# Patient Record
Sex: FEMALE | Race: WHITE | NOT HISPANIC OR LATINO | Employment: FULL TIME | URBAN - METROPOLITAN AREA
[De-identification: names, ages, dates, MRNs, and addresses within clinical notes are randomized per-mention and may not be internally consistent; named-entity substitution may affect disease eponyms.]

---
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
## 2023-08-16 ENCOUNTER — OFFICE VISIT (OUTPATIENT)
Dept: URGENT CARE | Age: 18
End: 2023-08-16
Payer: COMMERCIAL

## 2023-08-16 VITALS
HEART RATE: 115 BPM | OXYGEN SATURATION: 99 % | WEIGHT: 121.6 LBS | BODY MASS INDEX: 18.43 KG/M2 | RESPIRATION RATE: 18 BRPM | HEIGHT: 68 IN | DIASTOLIC BLOOD PRESSURE: 70 MMHG | SYSTOLIC BLOOD PRESSURE: 102 MMHG | TEMPERATURE: 100 F

## 2023-08-16 DIAGNOSIS — J02.9 ACUTE PHARYNGITIS, UNSPECIFIED ETIOLOGY: Primary | ICD-10-CM

## 2023-08-16 PROCEDURE — 99213 OFFICE O/P EST LOW 20 MIN: CPT | Performed by: PHYSICIAN ASSISTANT

## 2023-08-16 RX ORDER — AMOXICILLIN 500 MG/1
500 CAPSULE ORAL EVERY 8 HOURS SCHEDULED
Qty: 30 CAPSULE | Refills: 0 | Status: SHIPPED | OUTPATIENT
Start: 2023-08-16 | End: 2023-08-26

## 2023-08-16 NOTE — LETTER
August 16, 2023     Patient: Candance Merry   YOB: 2005   Date of Visit: 8/16/2023       To Whom It May Concern: It is my medical opinion that Candance Merry may return to work on 8/19/2023 . If you have any questions or concerns, please don't hesitate to call.          Sincerely,        Maureen Mendez PA-C    CC: No Recipients

## 2023-08-16 NOTE — PROGRESS NOTES
[facility]        NAME: Ashley Bryant is a 19 y.o. female  : 2005    MRN: [id number]  DATE: 2023  TIME: 10:46 AM    /70   Pulse (!) 115   Temp 100 °F (37.8 °C)   Resp 18   Ht 5' 8" (1.727 m)   Wt 55.2 kg (121 lb 9.6 oz)   SpO2 99%   BMI 18.49 kg/m²     Assessment and Plan   Acute pharyngitis, unspecified etiology [J02.9]  1. Acute pharyngitis, unspecified etiology  amoxicillin (AMOXIL) 500 mg capsule            Patient Instructions       Follow up with PCP in 3-5 days. Proceed to  ER if symptoms worsen. Chief Complaint     Chief Complaint   Patient presents with   • Sore Throat     Sore throat, fever, congestion, body aches. Started last night. Pt has not taken any OTC medications today          History of Present Illness       Pt with sore throat , fever  Ear pain       Review of Systems   Review of Systems   Constitutional: Positive for fever. HENT: Positive for congestion and sore throat. Eyes: Negative. Respiratory: Negative. Cardiovascular: Negative. Gastrointestinal: Negative. Endocrine: Negative. Genitourinary: Negative. Musculoskeletal: Positive for myalgias. Skin: Negative. Allergic/Immunologic: Negative. Neurological: Negative. Hematological: Negative. Psychiatric/Behavioral: Negative. All other systems reviewed and are negative.         Current Medications       Current Outpatient Medications:   •  amoxicillin (AMOXIL) 500 mg capsule, Take 1 capsule (500 mg total) by mouth every 8 (eight) hours for 10 days, Disp: 30 capsule, Rfl: 0  •  benzonatate (TESSALON) 200 MG capsule, Take 1 capsule (200 mg total) by mouth 3 (three) times a day as needed for cough (Patient not taking: Reported on 2023), Disp: 20 capsule, Rfl: 0  •  cetirizine (ZyrTEC) 10 mg tablet, Take 10 mg by mouth (Patient not taking: Reported on 2021), Disp: , Rfl:   •  fluticasone (FLONASE) 50 mcg/act nasal spray, 2 sprays into each nostril, Disp: , Rfl:   •  ibuprofen (ADVIL,MOTRIN) 100 MG chewable tablet, as neede (Patient not taking: No sig reported), Disp: , Rfl:   •  levonorgestrel-ethinyl estradiol (AVIANE,ALESSE,LESSINA) 0.1-20 MG-MCG per tablet, Take 1 tablet by mouth daily (Patient not taking: Reported on 12/18/2022), Disp: , Rfl: 0  •  omeprazole (PRILOSEC) 20 mg delayed release capsule, Take 20 mg by mouth, Disp: , Rfl:   •  ondansetron (ZOFRAN-ODT) 8 mg disintegrating tablet, Take 1 tablet (8 mg total) by mouth every 8 (eight) hours as needed for nausea or vomiting (Patient not taking: Reported on 8/24/2019), Disp: 20 tablet, Rfl: 0    Current Facility-Administered Medications:   •  neomycin-bacitracin-polymyxin b (NEOSPORIN) ointment 1 large application, 1 large application, Topical, BID, Clyde Alvarez PA-C, 1 large application at 13/99/54 2226    Current Allergies     Allergies as of 08/16/2023 - Reviewed 08/16/2023   Allergen Reaction Noted   • Other  12/01/2018            The following portions of the patient's history were reviewed and updated as appropriate: allergies, current medications, past family history, past medical history, past social history, past surgical history and problem list.     Past Medical History:   Diagnosis Date   • Asthma        History reviewed. No pertinent surgical history. History reviewed. No pertinent family history. Medications have been verified. Objective   /70   Pulse (!) 115   Temp 100 °F (37.8 °C)   Resp 18   Ht 5' 8" (1.727 m)   Wt 55.2 kg (121 lb 9.6 oz)   SpO2 99%   BMI 18.49 kg/m²        Physical Exam     Physical Exam  Vitals and nursing note reviewed. Constitutional:       Appearance: She is well-developed and normal weight. HENT:      Head: Normocephalic and atraumatic. Right Ear: Tympanic membrane and ear canal normal.      Left Ear: Tympanic membrane and ear canal normal.      Nose: Congestion present. Mouth/Throat:      Pharynx: Uvula midline. Oropharyngeal exudate and posterior oropharyngeal erythema present. Tonsils: Tonsillar exudate present. Eyes:      Conjunctiva/sclera: Conjunctivae normal.      Pupils: Pupils are equal, round, and reactive to light. Cardiovascular:      Rate and Rhythm: Normal rate and regular rhythm. Heart sounds: Normal heart sounds. Pulmonary:      Effort: Pulmonary effort is normal.      Breath sounds: Normal breath sounds. Abdominal:      Palpations: Abdomen is soft. Musculoskeletal:      Cervical back: Normal range of motion and neck supple. Lymphadenopathy:      Cervical: Cervical adenopathy present. Skin:     General: Skin is warm. Capillary Refill: Capillary refill takes less than 2 seconds. Neurological:      General: No focal deficit present. Mental Status: She is alert.    Psychiatric:         Mood and Affect: Mood normal.

## 2023-10-12 ENCOUNTER — OFFICE VISIT (OUTPATIENT)
Dept: URGENT CARE | Age: 18
End: 2023-10-12
Payer: COMMERCIAL

## 2023-10-12 ENCOUNTER — DOCUMENTATION (OUTPATIENT)
Dept: URGENT CARE | Age: 18
End: 2023-10-12

## 2023-10-12 VITALS — OXYGEN SATURATION: 99 % | RESPIRATION RATE: 18 BRPM | TEMPERATURE: 98.3 F | HEART RATE: 81 BPM

## 2023-10-12 DIAGNOSIS — H65.93 FLUID LEVEL BEHIND TYMPANIC MEMBRANE OF BOTH EARS: ICD-10-CM

## 2023-10-12 DIAGNOSIS — H60.502 ACUTE OTITIS EXTERNA OF LEFT EAR, UNSPECIFIED TYPE: Primary | ICD-10-CM

## 2023-10-12 PROCEDURE — 99213 OFFICE O/P EST LOW 20 MIN: CPT

## 2023-10-12 RX ORDER — FLUTICASONE PROPIONATE 50 MCG
1 SPRAY, SUSPENSION (ML) NASAL DAILY
Qty: 18.2 ML | Refills: 0 | Status: SHIPPED | OUTPATIENT
Start: 2023-10-12

## 2023-10-12 RX ORDER — OFLOXACIN 3 MG/ML
10 SOLUTION AURICULAR (OTIC) DAILY
Qty: 5 ML | Refills: 0 | Status: SHIPPED | OUTPATIENT
Start: 2023-10-12

## 2023-10-12 NOTE — PROGRESS NOTES
[facility]        NAME: Parker Vasquez is a 19 y.o. female  : 2005    MRN: [id number]  DATE: 2023  TIME: 11:38 AM    Assessment and Plan   Acute otitis externa of left ear, unspecified type [H60.502]  1. Acute otitis externa of left ear, unspecified type  Ambulatory Referral to Otolaryngology    ofloxacin (FLOXIN) 0.3 % otic solution      2. Fluid level behind tympanic membrane of both ears  fluticasone (FLONASE) 50 mcg/act nasal spray    Ambulatory Referral to Otolaryngology            Patient Instructions     Use antibiotic as prescribed  Avoid Q-Tip use  Tylenol/Ibuprofen for discomfort  Fluids  Change pillowcase daily  Follow up with PCP in 3-5 days. Proceed to  ER if symptoms worsen. Chief Complaint     Chief Complaint   Patient presents with    Earache     Left ear feel muffled for past 5 days. Painful. History of Present Illness       Patient is a 20 yo female with no significant PMH presenting in the clinic today for ear pain x 5 days. Admits left ear pain and left ear decreased hearing. Denies fever, chills, sore throat, headache, dizziness, visual changes, tinnitus, ear discharge, chest pain, and SOB. Admits the use of Sudafed for sx management. Review of Systems   Review of Systems   Constitutional:  Negative for chills, fatigue and fever. HENT:  Positive for ear pain and hearing loss. Negative for congestion, ear discharge, postnasal drip, rhinorrhea, sinus pressure, sinus pain, sore throat and tinnitus. Eyes:  Negative for visual disturbance. Respiratory:  Negative for cough and shortness of breath. Cardiovascular:  Negative for chest pain. Gastrointestinal:  Negative for abdominal pain, diarrhea, nausea and vomiting. Musculoskeletal:  Negative for myalgias. Skin:  Negative for rash. Neurological:  Negative for dizziness and headaches.          Current Medications       Current Outpatient Medications:     fluticasone (FLONASE) 50 mcg/act nasal spray, 1 spray into each nostril daily, Disp: 18.2 mL, Rfl: 0    ofloxacin (FLOXIN) 0.3 % otic solution, Administer 10 drops into the left ear daily, Disp: 5 mL, Rfl: 0    benzonatate (TESSALON) 200 MG capsule, Take 1 capsule (200 mg total) by mouth 3 (three) times a day as needed for cough (Patient not taking: Reported on 8/16/2023), Disp: 20 capsule, Rfl: 0    cetirizine (ZyrTEC) 10 mg tablet, Take 10 mg by mouth (Patient not taking: Reported on 12/1/2021), Disp: , Rfl:     ibuprofen (ADVIL,MOTRIN) 100 MG chewable tablet, as neede (Patient not taking: No sig reported), Disp: , Rfl:     levonorgestrel-ethinyl estradiol (AVIANE,ALESSE,LESSINA) 0.1-20 MG-MCG per tablet, Take 1 tablet by mouth daily (Patient not taking: Reported on 12/18/2022), Disp: , Rfl: 0    omeprazole (PRILOSEC) 20 mg delayed release capsule, Take 20 mg by mouth, Disp: , Rfl:     ondansetron (ZOFRAN-ODT) 8 mg disintegrating tablet, Take 1 tablet (8 mg total) by mouth every 8 (eight) hours as needed for nausea or vomiting (Patient not taking: Reported on 8/24/2019), Disp: 20 tablet, Rfl: 0    Current Facility-Administered Medications:     neomycin-bacitracin-polymyxin b (NEOSPORIN) ointment 1 large application, 1 large application, Topical, BID, Diana Felder PA-C, 1 large application at 97/29/39 2226    Current Allergies     Allergies as of 10/12/2023 - Reviewed 10/12/2023   Allergen Reaction Noted    Other  12/01/2018            The following portions of the patient's history were reviewed and updated as appropriate: allergies, current medications, past family history, past medical history, past social history, past surgical history and problem list.     Past Medical History:   Diagnosis Date    Asthma        No past surgical history on file. No family history on file. Medications have been verified.         Objective   Pulse 81   Temp 98.3 °F (36.8 °C)   Resp 18   SpO2 99%        Physical Exam     Physical Exam  Vitals reviewed. Constitutional:       General: She is not in acute distress. Appearance: Normal appearance. She is normal weight. She is not ill-appearing. HENT:      Head: Normocephalic. Right Ear: Hearing, ear canal and external ear normal. No drainage, swelling or tenderness. A middle ear effusion is present. There is no impacted cerumen. Tympanic membrane is not erythematous or bulging. Left Ear: Hearing, ear canal and external ear normal. Tenderness present. No drainage or swelling. A middle ear effusion is present. There is no impacted cerumen. Tympanic membrane is not erythematous or bulging. Nose: Congestion present. No rhinorrhea. Right Sinus: No maxillary sinus tenderness or frontal sinus tenderness. Left Sinus: No maxillary sinus tenderness or frontal sinus tenderness. Mouth/Throat:      Lips: Pink. Mouth: Mucous membranes are moist.      Pharynx: Oropharynx is clear. Uvula midline. No pharyngeal swelling, oropharyngeal exudate, posterior oropharyngeal erythema or uvula swelling. Tonsils: No tonsillar exudate or tonsillar abscesses. 1+ on the right. 1+ on the left. Comments: Copious postnasal drip present  Eyes:      General:         Right eye: No discharge. Left eye: No discharge. Conjunctiva/sclera: Conjunctivae normal.   Cardiovascular:      Rate and Rhythm: Normal rate and regular rhythm. Pulses: Normal pulses. Heart sounds: Normal heart sounds. No murmur heard. No friction rub. No gallop. Pulmonary:      Effort: Pulmonary effort is normal.      Breath sounds: Normal breath sounds. No wheezing, rhonchi or rales. Musculoskeletal:      Cervical back: Normal range of motion and neck supple. No tenderness. Lymphadenopathy:      Cervical: No cervical adenopathy. Skin:     General: Skin is warm. Findings: No rash. Neurological:      Mental Status: She is alert.    Psychiatric:         Mood and Affect: Mood normal. Behavior: Behavior normal.

## 2023-10-12 NOTE — PATIENT INSTRUCTIONS
Use antibiotic as prescribed  Avoid Q-Tip use  Tylenol/Ibuprofen for discomfort  Fluids  Change pillowcase daily  Follow up with PCP in 3-5 days. Proceed to ER if symptoms worsen.

## 2024-06-25 ENCOUNTER — APPOINTMENT (OUTPATIENT)
Dept: URGENT CARE | Facility: CLINIC | Age: 19
End: 2024-06-25
Payer: OTHER MISCELLANEOUS

## 2024-06-25 PROCEDURE — G0382 LEV 3 HOSP TYPE B ED VISIT: HCPCS

## 2024-06-25 PROCEDURE — 99283 EMERGENCY DEPT VISIT LOW MDM: CPT

## 2024-07-10 ENCOUNTER — OFFICE VISIT (OUTPATIENT)
Dept: OBGYN CLINIC | Facility: CLINIC | Age: 19
End: 2024-07-10
Payer: OTHER MISCELLANEOUS

## 2024-07-10 VITALS — HEIGHT: 68 IN | BODY MASS INDEX: 18.34 KG/M2 | WEIGHT: 121 LBS
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
## 2024-07-10 DIAGNOSIS — M25.511 RIGHT SHOULDER PAIN, UNSPECIFIED CHRONICITY: Primary | ICD-10-CM

## 2024-07-10 DIAGNOSIS — S46.211A STRAIN OF RIGHT BICEPS, INITIAL ENCOUNTER: ICD-10-CM

## 2024-07-10 PROCEDURE — 99203 OFFICE O/P NEW LOW 30 MIN: CPT | Performed by: FAMILY MEDICINE

## 2024-07-10 NOTE — LETTER
July 10, 2024     Patient: Nidia Moise  YOB: 2005  Date of Visit: 7/10/2024      To Whom it May Concern:    Nidia Moise is under my professional care. Nidia was seen in my office on 7/10/2024. Nidia may return to work with limitations right upper extremity .    No heavy lifting, pulling, pushing greater than 5 pounds.  Patient may complete sedentary/seated position.    We will reevaluate in 6 to 8 weeks.    If you have any questions or concerns, please don't hesitate to call.         Sincerely,          Linda Denney,         CC: No Recipients

## 2024-07-10 NOTE — PATIENT INSTRUCTIONS
Patient Education     Using Heat for Pain   About this topic   Heat is one of a few ways to help manage pain. Heat increases blood flow to an area. Heat also relaxes tight and sore muscles.  Heat can help many problems, such as:  Arthritis  Back or neck pain  Generalized stiffness  Cramps or muscle spasms  Fibromyalgia  Trigger points  Extremity pain  Bursitis  Headaches  Muscle soreness  General   Doctors and therapists may use heat as a part of your care. Use heat on your painful part for no more than 20 minutes at a time.  Here are the ways you can use heat at home:  Hot tubs, steam room, sauna, or a hot bath - Be sure that these are not too hot. Be sure not to stay in the bath or hot tub for too long. Take extra care when getting out.  Hot water bottles - Cover and place on the sore area.  Electric heating pad - Cover and place on the sore area. Be sure to have a thin layer between the heat and skin. Never sleep with a heating pad on as this can cause burns. Only use electric heating pads that have an auto shut off.  Hot compress - Wet a washcloth with hot water and then put it on the sore area.  Heat wraps or patches - You can buy these single use patches in stores.  Gel or rice packs - Heat in a microwave oven and place on your sore part. Check to make sure they are not too hot.  Paraffin wax - You dip your hand or foot into melted wax several times. Then wrap it with plastic or a towel for 10 to 20 minutes.  Do not use heat or talk to your doctor first if you:  Are pregnant  Have swelling  Have an infection  Have skin problems like blisters, open sores, or dermatitis  Have problems with sensation or feeling  Have conditions like diabetes, heart problems, or blood pressure problems  Have trouble with blood vessels or blood clots  What problems could happen?   Burns  Increased swelling  Blisters  Contact dermatitis  Pain continues  Helpful tips   Most often, you should not use heat within the first 48 hours  after an injury.  Make your own microwavable heat pack. Fill a long sock with rice. Sew or tie the end shut. Heat in the microwave 30 seconds at a time for up to 2 minutes until you get the desired temperature.  Warm your clothes in the dryer before you put them on for all over comfort.  Call your doctor if you see discolored skin, blisters, or swelling underneath the area where you had the heat source.  Last Reviewed Date   2022-12-19  Consumer Information Use and Disclaimer   This generalized information is a limited summary of diagnosis, treatment, and/or medication information. It is not meant to be comprehensive and should be used as a tool to help the user understand and/or assess potential diagnostic and treatment options. It does NOT include all information about conditions, treatments, medications, side effects, or risks that may apply to a specific patient. It is not intended to be medical advice or a substitute for the medical advice, diagnosis, or treatment of a health care provider based on the health care provider's examination and assessment of a patient’s specific and unique circumstances. Patients must speak with a health care provider for complete information about their health, medical questions, and treatment options, including any risks or benefits regarding use of medications. This information does not endorse any treatments or medications as safe, effective, or approved for treating a specific patient. [employer] and its affiliates disclaim any warranty or liability relating to this information or the use thereof. The use of this information is governed by the Terms of Use, available at https://www.[employer].com/en/know/clinical-effectiveness-terms   Copyright   Copyright © 2024 [employer], Inc[employer] and its affiliates and/or licensors. All rights reserved.  Patient Education     How to Do an Ice Massage   About this topic   Using ice after an injury can help in a few ways. It can lessen  swelling, inflammation, and pain. Doing an ice massage to your injured area is an easy thing to do at home. You will need some small paper or foam cups, water, and a freezer.  General   An ice massage is most helpful in the first 2 days of the injury. It can also be done after exercising. It is normal to have different feelings during the ice massage. These include cold, burning, aching, and numbness.  Fill a few small paper or foam cups close to the top with water.  Put them in the freezer for a few hours until they are fully solid.  Remove a frozen cup from the freezer. Tear the paper or foam from the top part of the cup until some of the ice is showing.  Move the ice over the injured place. Move the ice in circles or back and forth. Do this for 5 to 10 minutes at a time. Do not keep the ice in one place. You must keep it moving over the injured area. Do not use the ice massage for longer than 10 minutes at a time. This could cause frostbite and injure your skin.  Repeat this 3 to 5 times per day. Leave at least an hour in between icing sessions.  Avoid using the injured body part for about an hour until the tissue has warmed back up.  It is best to raise the injured part above the level of your heart during the ice massage.  It is normal for your skin to be pink or red after an ice massage. If you get any redness that does not go away or blistering, stop using the ice.  Helpful tips   Have a towel under the area you are icing to avoid a mess from the melting ice.  Paper or foam cups work the best. Plastic cups can have rough edges which may cut the skin.  Last Reviewed Date   2021-02-11  Consumer Information Use and Disclaimer   This generalized information is a limited summary of diagnosis, treatment, and/or medication information. It is not meant to be comprehensive and should be used as a tool to help the user understand and/or assess potential diagnostic and treatment options. It does NOT include all  information about conditions, treatments, medications, side effects, or risks that may apply to a specific patient. It is not intended to be medical advice or a substitute for the medical advice, diagnosis, or treatment of a health care provider based on the health care provider's examination and assessment of a patient’s specific and unique circumstances. Patients must speak with a health care provider for complete information about their health, medical questions, and treatment options, including any risks or benefits regarding use of medications. This information does not endorse any treatments or medications as safe, effective, or approved for treating a specific patient. Up[employer], Inc[employer] and its affiliates disclaim any warranty or liability relating to this information or the use thereof. The use of this information is governed by the Terms of Use, available at https://www.[employer].com/en/know/clinical-effectiveness-terms   Copyright   Copyright © 2024 [employer], Inc. and its affiliates and/or licensors. All rights reserved.

## 2024-07-10 NOTE — PROGRESS NOTES
Assessment:     1. Right shoulder pain, unspecified chronicity  XR shoulder 2+ vw right    XR humerus right      2. Work related injury  XR shoulder 2+ vw right    XR humerus right        Orders Placed This Encounter   Procedures    XR shoulder 2+ vw right    XR humerus right        Impression:   Right upper arm pain likely secondary to bicep strain.      Conservative Management   We discussed different treatment options:  06/25/2024 patient was seen at urgent care/care now.  Unable to review documentation completed by LATASHA at that time.  Patient states there were no x-rays obtained.  Ice or Heat Therapy as needed 1-2 times daily for 10-20 minutes. As tolerated.   Over the counter Tylenol and/or NSAIDs  as needed based off your Past Medical Hx. Please follow product label for dosing and maximum limits.    Trial of over the counter Topical Analgesics such as Lidocaine cream or Voltaren Gel, as tolerated. If skin becomes irritated, discontinue use.   Please range joint through gentle range of motion as tolerated.   Initiate Formal Physical Therapy at any preferred location.  Prescription provided  Work note provided.  Limitations provided.        Imaging   Reviewed prior xrays obtain in Urgent or Emergency Department. These were reviewed in office with patient today.   07/10/2024 right shoulder x-ray: No acute osseous abnormality  07/10/2024 right humerus x-ray: No acute osseous abnormality      Procedure  Not appropriate at this time.     Shared decision making, patient agreeable to plan.      Return for Follow up after 6-8 wks of formal therapy.    HPI:   Nidia Moise is a right-hand-dominant 19 y.o. female  who presents for evaluation of   Chief Complaint   Patient presents with    Right Upper Arm - Pain       Occupation: [employer]  Injury Related: Yes, Date of Injury: 06/25/2024  Workmen's Compensation.     Onset/Mechanism: Patient was at a [employer] working when she tried to move a box off a pallet and felt a pull  sensation in her right bicep.  Unable to finish her shift that day.  Was then seen at urgent care..  Location: Mid bicep region.  Severity: Current severity: 3/10. Max severity: 9/10.  Pain described as: Pulling, sharp  Radiation: Will radiate up into the shoulder.  Provocative: Lifting objects.  Associated symptoms: Numbness and tingling was noticed in the fourth and fifth digit on the day of the injury.  However that has resolved since..    Denies any hx of fracture of affected limb.   Denies any surgical history of affected limb.      Summary of treatment to-date:   Rest/activity modification  Ice therapy  Ibuprofen      Following History Reviewed and Updated     Past Medical History:   Diagnosis Date    Asthma      History reviewed. No pertinent surgical history.  History reviewed. No pertinent family history.    Social History     Substance and Sexual Activity   Alcohol Use No     Social History     Substance and Sexual Activity   Drug Use No     Social History     Tobacco Use   Smoking Status Every Day   Smokeless Tobacco Never       Social Determinants of Health     Tobacco Use: High Risk (7/10/2024)    Patient History     Smoking Tobacco Use: Every Day     Smokeless Tobacco Use: Never     Passive Exposure: Not on file   Alcohol Use: Not on file   Financial Resource Strain: Not on file   Food Insecurity: Not on file   Transportation Needs: Not on file   Physical Activity: Not on file   Stress: Not on file   Social Connections: Not on file   Intimate Partner Violence: Not on file   Depression: Not at risk (1/10/2024)    Received from [facility]    PHQ-2     PHQ-2 Score: 0   Housing Stability: Not on file   Utilities: Not on file   Health Literacy: Not on file        Allergies   Allergen Reactions    Dog Epithelium Allergic Rhinitis    Other     Bee Pollen Itching    Cat Hair Extract Allergic Rhinitis    Pollen Extract Allergic Rhinitis       Review of Systems      Review of Systems     Review  "of Systems   Constitutional: Negative for chills and fever.   HENT: Negative for drooling and sneezing.    Eyes: Negative for redness.   Respiratory: Negative for cough and wheezing.    Gastrointestinal: Negative for vomiting.   Psychiatric/Behavioral: Negative for behavioral problems. The patient is not nervous/anxious.      All other systems negative.   Physical Exam   Physical Exam    Vitals and nursing note reviewed.  Constitutional:   Appearance. Normal Appearance.  Ht 5' 8\" (1.727 m)   Wt 54.9 kg (121 lb)   BMI 18.40 kg/m²     Body mass index is 18.4 kg/m².   HENT:  Head: Atraumatic.  Nose: Nose normal  Eyes: Conjunctiva/sclera: Conjunctivae normal.  Cardiovascular:   Rate and Rhythm: Bilateral equal distal pulses  Pulmonary:   Effort: Pulmonary effort is normal  Skin:   General: Skin is warm and dry.  Neurological:   General: No focal deficit present.  Mental Status: Alert and oriented to person, place, and time.   Psychiatric:   Mood and Affect: mood normal.  Behavior: Behavior normal     Musculoskeletal Exam     Ortho Exam     Right shoulder:     INSPECTION:  Erythema Swelling Ecchymosis Increased warmth   Negative Neg. Neg. Neg.       RANGE OF MOTION:  C-Spine Flexion C-Spine Extension C-Spine Sidebending C-Spine Rotation    intact intact intact intact     Internal rotation in 90 degrees Abduction External rotation in 90 degrees Abduction Internal rotation in Adduction External rotation in Adduction     Thoracic spine intact      Forward Flexion Abduction   intact intact     STRENGTH:  Flexion Abduction Adduction   Intact Intact Intact       Okay Sign Finger abduction Thumb extension   Intact, bilaterally Intact, bilaterally Intact, bilaterally       ROTATOR CUFF:  Rotator cuff tear  Supraspinatus  Infraspinatus  Subscapularis    (Drop-Arm) (Empty can) (External rotation against resistance) (Belly press)   negative negative negative negative     IMPINGEMENT:  Neer's Nguyen-Ronn   Equivocal negative " "    BICEPS TENDINOPATHY:  Resisted forward flexion  Resisted supination   (Speed's) (Yergason's):    Positive reproducing chief complaint N/a      AC JOINT:  Forced cross body adduction (Scarf cross-arm) Job's AC compression   Negative N/a      LABRUM:  Robledo's: Labral Crank Test:    N/A N/a      APPREHENSION  Apprehension Galindo's Relocation Maneuver:    N/A N/A     Special test:  Spurlings    Negative     Distal hook test bilaterally intact  No Eliud's deformity.    Distal Sensation  Radial Pulse   Intact Bilaterally  Present and Equal Bilaterally             Procedures       Portions of the record may have been created with voice recognition software. Occasional wrong word or \"sound alike\" substitutions may have occurred due to the inherent limitations of voice recognition software. Please review the chart carefully and recognize, using context, where substitutions/typographical errors may have occurred.   "

## 2024-07-17 ENCOUNTER — TELEPHONE (OUTPATIENT)
Dept: OBGYN CLINIC | Facility: HOSPITAL | Age: 19
End: 2024-07-17

## 2024-07-17 NOTE — TELEPHONE ENCOUNTER
Caller: [facility] -      Doctor: Олег    Reason for call: Faxed latest PT script to [phone]    Call back#: n/a

## 2024-09-04 ENCOUNTER — OFFICE VISIT (OUTPATIENT)
Dept: OBGYN CLINIC | Facility: CLINIC | Age: 19
End: 2024-09-04

## 2024-09-04 VITALS — WEIGHT: 121 LBS | HEIGHT: 68 IN | BODY MASS INDEX: 18.34 KG/M2

## 2024-09-04 DIAGNOSIS — S46.211D STRAIN OF RIGHT BICEPS, SUBSEQUENT ENCOUNTER: ICD-10-CM
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
## 2024-09-04 DIAGNOSIS — M25.511 RIGHT SHOULDER PAIN, UNSPECIFIED CHRONICITY: Primary | ICD-10-CM

## 2024-09-04 PROCEDURE — 99213 OFFICE O/P EST LOW 20 MIN: CPT | Performed by: FAMILY MEDICINE

## 2024-09-04 NOTE — PROGRESS NOTES
Assessment:     1. Right shoulder pain, unspecified chronicity        2. Work related injury        3. Strain of right biceps, subsequent encounter          No orders of the defined types were placed in this encounter.       Impression:   Right upper arm pain likely secondary to bicep strain.       Conservative Management   We discussed different treatment options:  Previous visit/discussion  06/25/2024 patient was seen at urgent care/care now.  Unable to review documentation completed by LATASHA at that time.  Patient states there were no x-rays obtained.  Ice or Heat Therapy as needed 1-2 times daily for 10-20 minutes. As tolerated.   Over the counter Tylenol and/or NSAIDs  as needed based off your Past Medical Hx. Please follow product label for dosing and maximum limits.    Trial of over the counter Topical Analgesics such as Lidocaine cream or Voltaren Gel, as tolerated. If skin becomes irritated, discontinue use.   Please range joint through gentle range of motion as tolerated.   Initiate Formal Physical Therapy at any preferred location.  Prescription provided  Work note provided.  Limitations provided.  Today's discussion/review  Reviewed formal physical therapy documentation within the media section.  Forms uploaded on 08/15/2024  Work note provided.  Limitations updated.        Imaging   Reviewed prior xrays obtain in Urgent or Emergency Department. These were reviewed in office with patient today.   07/10/2024 right shoulder x-ray: No acute osseous abnormality  07/10/2024 right humerus x-ray: No acute osseous abnormality        Procedure  Not appropriate at this time.        Shared decision making, patient agreeable to plan.      Return for Follow up after 4 wks.    HPI:   Nidia Moise is a 19 y.o. female  who presents for evaluation of   Chief Complaint   Patient presents with    Right Upper Arm - Pain     Today's visit  Denies any new trauma.  Feels approximately 70+ percent improved.  Compliant with  formal physical therapy.      Previous visit 07/10/2024  Occupation: [employer]  Injury Related: Yes, Date of Injury: 06/25/2024  Workmen's Compensation.      Onset/Mechanism: Patient was at a [employer] working when she tried to move a box off a pallet and felt a pull sensation in her right bicep.  Unable to finish her shift that day.  Was then seen at urgent care..  Location: Mid bicep region.  Severity: Current severity: 3/10. Max severity: 9/10.  Pain described as: Pulling, sharp  Radiation: Will radiate up into the shoulder.  Provocative: Lifting objects.  Associated symptoms: Numbness and tingling was noticed in the fourth and fifth digit on the day of the injury.  However that has resolved since..     Denies any hx of fracture of affected limb.   Denies any surgical history of affected limb.       Summary of treatment to-date:   Rest/activity modification  Ice therapy  Ibuprofen       Following History Reviewed and Updated     Past Medical History:   Diagnosis Date    Asthma      History reviewed. No pertinent surgical history.  History reviewed. No pertinent family history.    Social History     Substance and Sexual Activity   Alcohol Use No     Social History     Substance and Sexual Activity   Drug Use No     Social History     Tobacco Use   Smoking Status Every Day   Smokeless Tobacco Never       Social Determinants of Health     Tobacco Use: High Risk (9/4/2024)    Patient History     Smoking Tobacco Use: Every Day     Smokeless Tobacco Use: Never     Passive Exposure: Not on file   Alcohol Use: Not on file   Financial Resource Strain: Not on file   Food Insecurity: Not on file   Transportation Needs: Not on file   Physical Activity: Not on file   Stress: Not on file   Social Connections: Not on file   Intimate Partner Violence: Not on file   Depression: Not at risk (1/10/2024)    Received from [facility]    PHQ-2     PHQ-2 Score: 0   Housing Stability: Not on file   Utilities: Not on file   Health  "Literacy: Not on file        Allergies   Allergen Reactions    Dog Epithelium Allergic Rhinitis    Other     Bee Pollen Itching    Cat Hair Extract Allergic Rhinitis    Pollen Extract Allergic Rhinitis       Review of Systems      Review of Systems     Review of Systems   Constitutional: Negative for chills and fever.   HENT: Negative for drooling and sneezing.    Eyes: Negative for redness.   Respiratory: Negative for cough and wheezing.    Gastrointestinal: Negative for vomiting.   Psychiatric/Behavioral: Negative for behavioral problems. The patient is not nervous/anxious.      All other systems negative.   Physical Exam   Physical Exam    Vitals and nursing note reviewed.  Constitutional:   Appearance. Normal Appearance.  Ht 5' 8\" (1.727 m)   Wt 54.9 kg (121 lb)   BMI 18.40 kg/m²     Body mass index is 18.4 kg/m².   HENT:  Head: Atraumatic.  Nose: Nose normal  Eyes: Conjunctiva/sclera: Conjunctivae normal.  Cardiovascular:   Rate and Rhythm: Bilateral equal distal pulses  Pulmonary:   Effort: Pulmonary effort is normal  Skin:   General: Skin is warm and dry.  Neurological:   General: No focal deficit present.  Mental Status: Alert and oriented to person, place, and time.   Psychiatric:   Mood and Affect: mood normal.  Behavior: Behavior normal     Musculoskeletal Exam     Ortho Exam     Right shoulder:      INSPECTION:  Erythema Swelling Ecchymosis Increased warmth   Negative Neg. Neg. Neg.         RANGE OF MOTION:  C-Spine Flexion C-Spine Extension C-Spine Sidebending C-Spine Rotation    intact intact intact intact      Internal rotation in 90 degrees Abduction External rotation in 90 degrees Abduction Internal rotation in Adduction External rotation in Adduction       Thoracic spine intact       Forward Flexion Abduction   intact intact      STRENGTH:  Flexion Abduction Adduction   Intact Intact Intact         Okay Sign Finger abduction Thumb extension   Intact, bilaterally Intact, bilaterally Intact, " "bilaterally         ROTATOR CUFF:  Rotator cuff tear  Supraspinatus  Infraspinatus  Subscapularis    (Drop-Arm) (Empty can) (External rotation against resistance) (Belly press)   negative negative negative negative      IMPINGEMENT:  Neer's Nguyen-Ronn   Negative negative      BICEPS TENDINOPATHY:  Resisted forward flexion  Resisted supination   (Speed's) (Yergason's):    Minimal discomfort today N/a       AC JOINT:  Forced cross body adduction (Scarf cross-arm) Job's AC compression   1 equivocal N/a       LABRUM:  Robledo's: Labral Crank Test:    Equivocal N/a       APPREHENSION  Apprehension Galindo's Relocation Maneuver:    N/A N/A      Special test:  Spurlings          Distal hook test bilaterally intact  No Eliud's deformity.     Distal Sensation  Radial Pulse   Intact Bilaterally  Present and Equal Bilaterally               Procedures       Portions of the record may have been created with voice recognition software. Occasional wrong word or \"sound alike\" substitutions may have occurred due to the inherent limitations of voice recognition software. Please review the chart carefully and recognize, using context, where substitutions/typographical errors may have occurred.   "

## 2024-09-04 NOTE — LETTER
September 4, 2024     Patient: Nidia Moise  YOB: 2005  Date of Visit: 9/4/2024      To Whom it May Concern:    Nidia Moise is under my professional care. Nidia was seen in my office on 9/4/2024. Nidia may return to work with limitations right upper extremity. .    No heavy lifting, pushing, pulling greater than 30 pounds.  Please allow patient to attempt normal work upper extremity activities with a reduction in frequency and goals, as tolerated.    We will reevaluate in 4 weeks.      If you have any questions or concerns, please don't hesitate to call.         Sincerely,          Linda Denney,         CC: No Recipients

## 2024-10-07 ENCOUNTER — OFFICE VISIT (OUTPATIENT)
Dept: OBGYN CLINIC | Facility: MEDICAL CENTER | Age: 19
End: 2024-10-07

## 2024-10-07 VITALS
HEIGHT: 68 IN | WEIGHT: 121 LBS | DIASTOLIC BLOOD PRESSURE: 76 MMHG | HEART RATE: 78 BPM | SYSTOLIC BLOOD PRESSURE: 110 MMHG | BODY MASS INDEX: 18.34 KG/M2

## 2024-10-07 DIAGNOSIS — S46.211D STRAIN OF RIGHT BICEPS, SUBSEQUENT ENCOUNTER: Primary | ICD-10-CM

## 2024-10-07 PROCEDURE — 99213 OFFICE O/P EST LOW 20 MIN: CPT | Performed by: EMERGENCY MEDICINE

## 2024-10-07 NOTE — LETTER
October 7, 2024     Patient: Nidia Moise  YOB: 2005  Date of Visit: 10/7/2024      To Whom it May Concern:    Nidia Moise is under my professional care. Nidia was seen in my office on 10/7/2024. 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  F/U 2 weeks.  May wean from PT to HEP.      If you have any questions or concerns, please don't hesitate to call.         Sincerely,          Fabian Puckett MD        CC: No Recipients

## 2024-10-07 NOTE — PROGRESS NOTES
Assessment/Plan:    Diagnoses and all orders for this visit:    Strain of right biceps, subsequent encounter    At this time patient feels that she is at the point where she can return to a trial of full duty.  She is concerned however that she does have some discomfort and she is not sure if this is from her injury or if this is due to normal muscle soreness after exercise and therapy.  We will return her to a trial of full duty and see her back in 2 to 3 weeks with hopes of discharge.  If her symptoms worsen or continue to consider MRI of the upper extremity.  At this point in time there is no concerns with injury to the distal biceps tendon.  Wean to HEP    Reviewed prior [facility] notes and Xrays    Return in about 2 weeks (around 10/21/2024).      Subjective:   Patient ID: Nidia Moise is a 19 y.o. female.     DOI 06/25/2024  Occupation: [employer]    Patient new to me presents with   for a f/u right biceps strain.  She notes improvement of symptoms, benefiting from PT ([facility]) lifting 45 lbs now which does cause muscle aching pain.  No light duty available.  She states she is ready to go back to work but is concerned about increasing her pain and also if her pick rate may be affected    Onset/Mechanism: Patient was at a [employer] working when she tried to move a box off a pallet and felt a pull sensation in her right bicep.  Unable to finish her shift that day.  Was then seen at urgent care.  She did not hear a pop, noticed mild swelling, denies bruising      Review of Systems    The following portions of the patient's chart were reviewed and updated as appropriate:   Allergy:    Allergies   Allergen Reactions    Dog Epithelium Allergic Rhinitis    Other     Bee Pollen Itching    Cat Hair Extract Allergic Rhinitis    Pollen Extract Allergic Rhinitis       Medications:    Current Outpatient Medications:     benzonatate (TESSALON) 200 MG capsule, Take 1 capsule (200 mg total) by mouth 3 (three)  "times a day as needed for cough (Patient not taking: Reported on 8/16/2023), Disp: 20 capsule, Rfl: 0    cetirizine (ZyrTEC) 10 mg tablet, Take 10 mg by mouth (Patient not taking: Reported on 12/1/2021), Disp: , Rfl:     fluticasone (FLONASE) 50 mcg/act nasal spray, 1 spray into each nostril daily (Patient not taking: Reported on 7/10/2024), Disp: 18.2 mL, Rfl: 0    ibuprofen (ADVIL,MOTRIN) 100 MG chewable tablet, as neede (Patient not taking: No sig reported), Disp: , Rfl:     levonorgestrel-ethinyl estradiol (AVIANE,ALESSE,LESSINA) 0.1-20 MG-MCG per tablet, Take 1 tablet by mouth daily (Patient not taking: Reported on 12/18/2022), Disp: , Rfl: 0    ofloxacin (FLOXIN) 0.3 % otic solution, Administer 10 drops into the left ear daily (Patient not taking: Reported on 7/10/2024), Disp: 5 mL, Rfl: 0    omeprazole (PRILOSEC) 20 mg delayed release capsule, Take 20 mg by mouth, Disp: , Rfl:     ondansetron (ZOFRAN-ODT) 8 mg disintegrating tablet, Take 1 tablet (8 mg total) by mouth every 8 (eight) hours as needed for nausea or vomiting (Patient not taking: Reported on 8/24/2019), Disp: 20 tablet, Rfl: 0    Current Facility-Administered Medications:     neomycin-bacitracin-polymyxin b (NEOSPORIN) ointment 1 large application, 1 large application, Topical, BID, Sacha A Adhikari LATASHA Andrea, 1 large application at 12/01/21 2226    There is no problem list on file for this patient.      Objective:  /76   Pulse 78   Ht 5' 8\" (1.727 m)   Wt 54.9 kg (121 lb)   BMI 18.40 kg/m²     Right Elbow Exam     Range of Motion   The patient has normal right elbow ROM.    Muscle Strength   The patient has normal right elbow strength.    Other   Erythema: absent  Sensation: normal    Comments:  Ttp distal biceps muscle belly  Neg hook test, normal inspection  Distal ulnar strength 5/5  Symptoms reproduced with resisted elbow flexion and supination as well as Yergason's Test            Physical Exam      Neurologic " Exam    Procedures    I have personally reviewed the written report of the pertinent studies.   RIGHT SHOULDER     INDICATION:   Pain in right shoulder. [employer] activity done for income or pay.      COMPARISON:  None.     VIEWS:  XR SHOULDER 2+ VW RIGHT     FINDINGS:     There is no acute fracture or dislocation.     No significant degenerative changes.     No lytic or blastic osseous lesion.     Soft tissues are unremarkable.     IMPRESSION:     No acute osseous abnormality.            Past Medical History:   Diagnosis Date    Asthma        History reviewed. No pertinent surgical history.    Social History     Socioeconomic History    Marital status: Single     Spouse name: Not on file    Number of children: Not on file    Years of education: Not on file    Highest education level: Not on file   Occupational History    Not on file   Tobacco Use    Smoking status: Every Day    Smokeless tobacco: Never   Substance and Sexual Activity    Alcohol use: No    Drug use: No    Sexual activity: Not on file   Other Topics Concern    Not on file   Social History Narrative    Not on file     Social Determinants of Health     Financial Resource Strain: Not on file   Food Insecurity: Not on file   Transportation Needs: Not on file   Physical Activity: Not on file   Stress: Not on file   Social Connections: Not on file   Intimate Partner Violence: Not on file   Housing Stability: Not on file       History reviewed. No pertinent family history.

## 2024-10-22 ENCOUNTER — OFFICE VISIT (OUTPATIENT)
Dept: OBGYN CLINIC | Facility: MEDICAL CENTER | Age: 19
End: 2024-10-22
Payer: COMMERCIAL

## 2024-10-22 VITALS
DIASTOLIC BLOOD PRESSURE: 62 MMHG | HEART RATE: 58 BPM | BODY MASS INDEX: 18.34 KG/M2 | SYSTOLIC BLOOD PRESSURE: 118 MMHG | HEIGHT: 68 IN | WEIGHT: 121 LBS

## 2024-10-22 DIAGNOSIS — S46.211D STRAIN OF RIGHT BICEPS, SUBSEQUENT ENCOUNTER: Primary | ICD-10-CM

## 2024-10-22 PROCEDURE — 99213 OFFICE O/P EST LOW 20 MIN: CPT | Performed by: EMERGENCY MEDICINE

## 2024-10-22 NOTE — LETTER
October 22, 2024     Patient: Nidia Moise  YOB: 2005  Date of Visit: 10/22/2024      To Whom it May Concern:    Nidia Moise is under my professional care. Nidia was seen in my office on 10/22/2024. Nidia may continue full duty.  Follow up as needed.      If you have any questions or concerns, please don't hesitate to call.         Sincerely,          Fabian Puckett MD        CC: No Recipients

## 2024-10-22 NOTE — PROGRESS NOTES
Assessment/Plan:    Diagnoses and all orders for this visit:    Strain of right biceps, subsequent encounter    Continue full duty.  She still has some residual symptoms of the muscle belly, Xrays humerus wnl.  We discussed further treatment and diagnostic options, at this time she is comfortable continuing full duty and f/u PRN, declines MRI at this time.  She should return if worsening symptoms    present via patient's phone    Return if symptoms worsen or fail to improve.      Subjective:   Patient ID: Nidia Moise is a 19 y.o. female.     DOI 06/25/2024  Occupation: [employer]    Nidia returns tolerating full duty since last eval still noting some discomfort of the distal biceps muscle 3-4/10 while working and use.  She denies pain at rest.  No longer with symptoms of the hand.      Previous note:  Patient new to me presents with   for a f/u right biceps strain.  She notes improvement of symptoms, benefiting from PT ([facility]) lifting 45 lbs now which does cause muscle aching pain.  No light duty available.  She states she is ready to go back to work but is concerned about increasing her pain and also if her pick rate may be affected    Onset/Mechanism: Patient was at a [employer] working when she tried to move a box off a pallet and felt a pull sensation in her right bicep.  Unable to finish her shift that day.  Was then seen at urgent care.  She did not hear a pop, noticed mild swelling, denies bruising      Review of Systems    The following portions of the patient's chart were reviewed and updated as appropriate:   Allergy:    Allergies   Allergen Reactions    Dog Epithelium Allergic Rhinitis    Other     Bee Pollen Itching    Cat Hair Extract Allergic Rhinitis    Pollen Extract Allergic Rhinitis       Medications:    Current Outpatient Medications:     benzonatate (TESSALON) 200 MG capsule, Take 1 capsule (200 mg total) by mouth 3 (three) times a day as needed for cough  "(Patient not taking: Reported on 8/16/2023), Disp: 20 capsule, Rfl: 0    cetirizine (ZyrTEC) 10 mg tablet, Take 10 mg by mouth (Patient not taking: Reported on 12/1/2021), Disp: , Rfl:     fluticasone (FLONASE) 50 mcg/act nasal spray, 1 spray into each nostril daily (Patient not taking: Reported on 7/10/2024), Disp: 18.2 mL, Rfl: 0    ibuprofen (ADVIL,MOTRIN) 100 MG chewable tablet, as neede (Patient not taking: No sig reported), Disp: , Rfl:     levonorgestrel-ethinyl estradiol (AVIANE,ALESSE,LESSINA) 0.1-20 MG-MCG per tablet, Take 1 tablet by mouth daily (Patient not taking: Reported on 12/18/2022), Disp: , Rfl: 0    ofloxacin (FLOXIN) 0.3 % otic solution, Administer 10 drops into the left ear daily (Patient not taking: Reported on 7/10/2024), Disp: 5 mL, Rfl: 0    omeprazole (PRILOSEC) 20 mg delayed release capsule, Take 20 mg by mouth, Disp: , Rfl:     ondansetron (ZOFRAN-ODT) 8 mg disintegrating tablet, Take 1 tablet (8 mg total) by mouth every 8 (eight) hours as needed for nausea or vomiting (Patient not taking: Reported on 8/24/2019), Disp: 20 tablet, Rfl: 0    Current Facility-Administered Medications:     neomycin-bacitracin-polymyxin b (NEOSPORIN) ointment 1 large application, 1 large application, Topical, BID, Sacha Adhikari Jr., PA-C, 1 large application at 12/01/21 2226    There is no problem list on file for this patient.      Objective:  /62   Pulse 58   Ht 5' 8\" (1.727 m)   Wt 54.9 kg (121 lb)   BMI 18.40 kg/m²     Right Elbow Exam     Range of Motion   The patient has normal right elbow ROM.    Other   Erythema: absent    Comments:  Ttp mid to distal biceps muscle belly  Distal biceps tendon nontender no defect, neg hook test, full flexion strength with muscle discomfort            Physical Exam      Neurologic Exam    Procedures    I have personally reviewed the written report of the pertinent studies.             Past Medical History:   Diagnosis Date    Asthma        History reviewed. " No pertinent surgical history.    Social History     Socioeconomic History    Marital status: Single     Spouse name: Not on file    Number of children: Not on file    Years of education: Not on file    Highest education level: Not on file   Occupational History    Not on file   Tobacco Use    Smoking status: Every Day    Smokeless tobacco: Never   Substance and Sexual Activity    Alcohol use: No    Drug use: No    Sexual activity: Not on file   Other Topics Concern    Not on file   Social History Narrative    Not on file     Social Determinants of Health     Financial Resource Strain: Not on file   Food Insecurity: Not on file   Transportation Needs: Not on file   Physical Activity: Not on file   Stress: Not on file   Social Connections: Not on file   Intimate Partner Violence: Not on file   Housing Stability: Not on file       History reviewed. No pertinent family history.

## 2025-02-09 ENCOUNTER — APPOINTMENT (OUTPATIENT)
Dept: RADIOLOGY | Facility: CLINIC | Age: 20
End: 2025-02-09
Payer: COMMERCIAL

## 2025-02-09 ENCOUNTER — OCCMED (OUTPATIENT)
Dept: URGENT CARE | Facility: CLINIC | Age: 20
End: 2025-02-09
Payer: OTHER MISCELLANEOUS

## 2025-02-09 DIAGNOSIS — M54.89 OTHER ACUTE BACK PAIN: Primary | ICD-10-CM

## 2025-02-09 DIAGNOSIS — M54.89 OTHER ACUTE BACK PAIN: ICD-10-CM

## 2025-02-09 PROCEDURE — 99283 EMERGENCY DEPT VISIT LOW MDM: CPT | Performed by: PHYSICIAN ASSISTANT

## 2025-02-09 PROCEDURE — 72040 X-RAY EXAM NECK SPINE 2-3 VW: CPT

## 2025-02-09 PROCEDURE — 72072 X-RAY EXAM THORAC SPINE 3VWS: CPT

## 2025-02-09 PROCEDURE — G0382 LEV 3 HOSP TYPE B ED VISIT: HCPCS | Performed by: PHYSICIAN ASSISTANT

## 2025-02-09 NOTE — PROGRESS NOTES
[facility]        NAME: Nidia Moise is a 19 y.o. female  : 2005    MRN: [id number]  DATE: 2025  TIME: 11:04 AM    There were no vitals taken for this visit.    Assessment and Plan   Other acute back pain [M54.89]  1. Other acute back pain  XR spine cervical 2 or 3 vw injury    XR spine thoracic 3 vw            Patient Instructions       Follow up with PCP in 3-5 days.  Proceed to  ER if symptoms worsen.    Chief Complaint   No chief complaint on file.        History of Present Illness       HPI    Review of Systems   Review of Systems      Current Medications       Current Outpatient Medications:     benzonatate (TESSALON) 200 MG capsule, Take 1 capsule (200 mg total) by mouth 3 (three) times a day as needed for cough (Patient not taking: Reported on 2023), Disp: 20 capsule, Rfl: 0    cetirizine (ZyrTEC) 10 mg tablet, Take 10 mg by mouth (Patient not taking: Reported on 2021), Disp: , Rfl:     fluticasone (FLONASE) 50 mcg/act nasal spray, 1 spray into each nostril daily (Patient not taking: Reported on 7/10/2024), Disp: 18.2 mL, Rfl: 0    ibuprofen (ADVIL,MOTRIN) 100 MG chewable tablet, as neede (Patient not taking: No sig reported), Disp: , Rfl:     levonorgestrel-ethinyl estradiol (AVIANE,ALESSE,LESSINA) 0.1-20 MG-MCG per tablet, Take 1 tablet by mouth daily (Patient not taking: Reported on 2022), Disp: , Rfl: 0    ofloxacin (FLOXIN) 0.3 % otic solution, Administer 10 drops into the left ear daily (Patient not taking: Reported on 7/10/2024), Disp: 5 mL, Rfl: 0    omeprazole (PRILOSEC) 20 mg delayed release capsule, Take 20 mg by mouth, Disp: , Rfl:     ondansetron (ZOFRAN-ODT) 8 mg disintegrating tablet, Take 1 tablet (8 mg total) by mouth every 8 (eight) hours as needed for nausea or vomiting (Patient not taking: Reported on 2019), Disp: 20 tablet, Rfl: 0    Current Facility-Administered Medications:     neomycin-bacitracin-polymyxin b (NEOSPORIN) ointment 1  large application, 1 large application, Topical, BID, Sacha Adhikari Jr., LATASHA, 1 large application at 12/01/21 2226    Current Allergies     Allergies as of 02/09/2025 - Reviewed 10/22/2024   Allergen Reaction Noted    Dog epithelium Allergic Rhinitis 12/01/2018    Other  12/01/2018    Bee pollen Itching 09/21/2016    Cat dander Allergic Rhinitis 12/01/2018    Pollen extract Allergic Rhinitis 09/21/2016            The following portions of the patient's history were reviewed and updated as appropriate: allergies, current medications, past family history, past medical history, past social history, past surgical history and problem list.     Past Medical History:   Diagnosis Date    Asthma        No past surgical history on file.    No family history on file.      Medications have been verified.        Objective   There were no vitals taken for this visit.       Physical Exam     Physical Exam

## 2025-02-13 ENCOUNTER — APPOINTMENT (OUTPATIENT)
Dept: URGENT CARE | Facility: CLINIC | Age: 20
End: 2025-02-13
Payer: OTHER MISCELLANEOUS

## 2025-02-13 PROCEDURE — 99213 OFFICE O/P EST LOW 20 MIN: CPT

## 2025-02-17 ENCOUNTER — APPOINTMENT (OUTPATIENT)
Dept: URGENT CARE | Facility: CLINIC | Age: 20
End: 2025-02-17
Payer: OTHER MISCELLANEOUS

## 2025-02-17 PROCEDURE — 99213 OFFICE O/P EST LOW 20 MIN: CPT | Performed by: NURSE PRACTITIONER

## 2025-07-29 ENCOUNTER — HOSPITAL ENCOUNTER (EMERGENCY)
Facility: HOSPITAL | Age: 20
Discharge: HOME | End: 2025-07-29
Attending: EMERGENCY MEDICINE | Admitting: EMERGENCY MEDICINE
Payer: COMMERCIAL

## 2025-07-29 ENCOUNTER — APPOINTMENT (EMERGENCY)
Dept: CT IMAGING | Facility: HOSPITAL | Age: 20
End: 2025-07-29
Payer: COMMERCIAL

## 2025-07-29 VITALS
OXYGEN SATURATION: 97 % | BODY MASS INDEX: 18.1 KG/M2 | HEART RATE: 71 BPM | TEMPERATURE: 98.9 F | WEIGHT: 119.05 LBS | RESPIRATION RATE: 18 BRPM | DIASTOLIC BLOOD PRESSURE: 54 MMHG | SYSTOLIC BLOOD PRESSURE: 90 MMHG

## 2025-07-29 DIAGNOSIS — N83.10 CORPUS LUTEUM CYST: ICD-10-CM

## 2025-07-29 DIAGNOSIS — R11.2 NAUSEA AND VOMITING: Primary | ICD-10-CM

## 2025-07-29 LAB
ALBUMIN SERPL BCG-MCNC: 5.1 G/DL (ref 3.5–5)
ALP SERPL-CCNC: 70 U/L (ref 34–104)
ALT SERPL W P-5'-P-CCNC: 14 U/L (ref 7–52)
ANION GAP SERPL CALCULATED.3IONS-SCNC: 10 MMOL/L (ref 4–13)
AST SERPL W P-5'-P-CCNC: 12 U/L (ref 13–39)
BACTERIA UR QL AUTO: ABNORMAL /HPF
BASOPHILS # BLD AUTO: 0.02 THOUSANDS/ÂΜL (ref 0–0.1)
BASOPHILS NFR BLD AUTO: 0 % (ref 0–1)
BILIRUB SERPL-MCNC: 0.71 MG/DL (ref 0.2–1)
BILIRUB UR QL STRIP: NEGATIVE
BUN SERPL-MCNC: 15 MG/DL (ref 5–25)
CALCIUM SERPL-MCNC: 10 MG/DL (ref 8.4–10.2)
CHLORIDE SERPL-SCNC: 103 MMOL/L (ref 96–108)
CLARITY UR: CLEAR
CO2 SERPL-SCNC: 24 MMOL/L (ref 21–32)
COLOR UR: YELLOW
CREAT SERPL-MCNC: 0.73 MG/DL (ref 0.6–1.3)
EOSINOPHIL # BLD AUTO: 0.01 THOUSAND/ÂΜL (ref 0–0.61)
EOSINOPHIL NFR BLD AUTO: 0 % (ref 0–6)
ERYTHROCYTE [DISTWIDTH] IN BLOOD BY AUTOMATED COUNT: 12.2 % (ref 11.6–15.1)
EXT PREGNANCY TEST URINE: NEGATIVE
EXT. CONTROL: NORMAL
FLUAV RNA RESP QL NAA+PROBE: NEGATIVE
FLUBV RNA RESP QL NAA+PROBE: NEGATIVE
GFR SERPL CREATININE-BSD FRML MDRD: 117 ML/MIN/1.73SQ M
GLUCOSE SERPL-MCNC: 139 MG/DL (ref 65–140)
GLUCOSE UR STRIP-MCNC: NEGATIVE MG/DL
HCT VFR BLD AUTO: 35.8 % (ref 34.8–46.1)
HGB BLD-MCNC: 12.7 G/DL (ref 11.5–15.4)
HGB UR QL STRIP.AUTO: NEGATIVE
IMM GRANULOCYTES # BLD AUTO: 0.05 THOUSAND/UL (ref 0–0.2)
IMM GRANULOCYTES NFR BLD AUTO: 0 % (ref 0–2)
KETONES UR STRIP-MCNC: ABNORMAL MG/DL
LEUKOCYTE ESTERASE UR QL STRIP: NEGATIVE
LIPASE SERPL-CCNC: 18 U/L (ref 11–82)
LYMPHOCYTES # BLD AUTO: 0.74 THOUSANDS/ÂΜL (ref 0.6–4.47)
LYMPHOCYTES NFR BLD AUTO: 6 % (ref 14–44)
MAGNESIUM SERPL-MCNC: 1.7 MG/DL (ref 1.9–2.7)
MCH RBC QN AUTO: 33 PG (ref 26.8–34.3)
MCHC RBC AUTO-ENTMCNC: 35.5 G/DL (ref 31.4–37.4)
MCV RBC AUTO: 93 FL (ref 82–98)
MONOCYTES # BLD AUTO: 0.18 THOUSAND/ÂΜL (ref 0.17–1.22)
MONOCYTES NFR BLD AUTO: 2 % (ref 4–12)
MUCOUS THREADS UR QL AUTO: ABNORMAL
NEUTROPHILS # BLD AUTO: 10.72 THOUSANDS/ÂΜL (ref 1.85–7.62)
NEUTS SEG NFR BLD AUTO: 92 % (ref 43–75)
NITRITE UR QL STRIP: NEGATIVE
NON-SQ EPI CELLS URNS QL MICRO: ABNORMAL /HPF
NRBC BLD AUTO-RTO: 0 /100 WBCS
PH UR STRIP.AUTO: 6 [PH] (ref 4.5–8)
PLATELET # BLD AUTO: 277 THOUSANDS/UL (ref 149–390)
PMV BLD AUTO: 10.6 FL (ref 8.9–12.7)
POTASSIUM SERPL-SCNC: 3.7 MMOL/L (ref 3.5–5.3)
PROT SERPL-MCNC: 8.2 G/DL (ref 6.4–8.4)
PROT UR STRIP-MCNC: ABNORMAL MG/DL
RBC # BLD AUTO: 3.85 MILLION/UL (ref 3.81–5.12)
RBC #/AREA URNS AUTO: ABNORMAL /HPF
RSV RNA RESP QL NAA+PROBE: NEGATIVE
SARS-COV-2 RNA RESP QL NAA+PROBE: NEGATIVE
SODIUM SERPL-SCNC: 137 MMOL/L (ref 135–147)
SP GR UR STRIP.AUTO: >=1.03 (ref 1–1.03)
UROBILINOGEN UR QL STRIP.AUTO: 0.2 E.U./DL
WBC # BLD AUTO: 11.72 THOUSAND/UL (ref 4.31–10.16)
WBC #/AREA URNS AUTO: ABNORMAL /HPF

## 2025-07-29 PROCEDURE — 99285 EMERGENCY DEPT VISIT HI MDM: CPT | Performed by: PHYSICIAN ASSISTANT

## 2025-07-29 PROCEDURE — 96361 HYDRATE IV INFUSION ADD-ON: CPT

## 2025-07-29 PROCEDURE — 96365 THER/PROPH/DIAG IV INF INIT: CPT

## 2025-07-29 PROCEDURE — 96375 TX/PRO/DX INJ NEW DRUG ADDON: CPT

## 2025-07-29 PROCEDURE — 81001 URINALYSIS AUTO W/SCOPE: CPT

## 2025-07-29 PROCEDURE — 83690 ASSAY OF LIPASE: CPT | Performed by: PHYSICIAN ASSISTANT

## 2025-07-29 PROCEDURE — 99283 EMERGENCY DEPT VISIT LOW MDM: CPT

## 2025-07-29 PROCEDURE — 80053 COMPREHEN METABOLIC PANEL: CPT | Performed by: PHYSICIAN ASSISTANT

## 2025-07-29 PROCEDURE — 36415 COLL VENOUS BLD VENIPUNCTURE: CPT | Performed by: PHYSICIAN ASSISTANT

## 2025-07-29 PROCEDURE — 83735 ASSAY OF MAGNESIUM: CPT | Performed by: PHYSICIAN ASSISTANT

## 2025-07-29 PROCEDURE — 81025 URINE PREGNANCY TEST: CPT | Performed by: PHYSICIAN ASSISTANT

## 2025-07-29 PROCEDURE — 96366 THER/PROPH/DIAG IV INF ADDON: CPT

## 2025-07-29 PROCEDURE — 74177 CT ABD & PELVIS W/CONTRAST: CPT

## 2025-07-29 PROCEDURE — 85025 COMPLETE CBC W/AUTO DIFF WBC: CPT | Performed by: PHYSICIAN ASSISTANT

## 2025-07-29 PROCEDURE — 87637 SARSCOV2&INF A&B&RSV AMP PRB: CPT | Performed by: PHYSICIAN ASSISTANT

## 2025-07-29 RX ORDER — METOCLOPRAMIDE 10 MG/1
10 TABLET ORAL EVERY 8 HOURS PRN
Qty: 15 TABLET | Refills: 0 | Status: SHIPPED | OUTPATIENT
Start: 2025-07-29

## 2025-07-29 RX ORDER — MAGNESIUM SULFATE HEPTAHYDRATE 40 MG/ML
2 INJECTION, SOLUTION INTRAVENOUS ONCE
Status: COMPLETED | OUTPATIENT
Start: 2025-07-29 | End: 2025-07-29

## 2025-07-29 RX ORDER — ONDANSETRON 2 MG/ML
4 INJECTION INTRAMUSCULAR; INTRAVENOUS ONCE
Status: COMPLETED | OUTPATIENT
Start: 2025-07-29 | End: 2025-07-29

## 2025-07-29 RX ORDER — METOCLOPRAMIDE HYDROCHLORIDE 5 MG/ML
10 INJECTION INTRAMUSCULAR; INTRAVENOUS ONCE
Status: COMPLETED | OUTPATIENT
Start: 2025-07-29 | End: 2025-07-29

## 2025-07-29 RX ADMIN — MAGNESIUM SULFATE HEPTAHYDRATE 2 G: 40 INJECTION, SOLUTION INTRAVENOUS at 18:41

## 2025-07-29 RX ADMIN — SODIUM CHLORIDE 1000 ML: 0.9 INJECTION, SOLUTION INTRAVENOUS at 17:36

## 2025-07-29 RX ADMIN — IOHEXOL 85 ML: 350 INJECTION, SOLUTION INTRAVENOUS at 19:58

## 2025-07-29 RX ADMIN — ONDANSETRON 4 MG: 2 INJECTION INTRAMUSCULAR; INTRAVENOUS at 17:36

## 2025-07-29 RX ADMIN — METOCLOPRAMIDE 10 MG: 5 INJECTION, SOLUTION INTRAMUSCULAR; INTRAVENOUS at 19:00
